# Patient Record
Sex: FEMALE | Race: WHITE | NOT HISPANIC OR LATINO | Employment: OTHER | URBAN - METROPOLITAN AREA
[De-identification: names, ages, dates, MRNs, and addresses within clinical notes are randomized per-mention and may not be internally consistent; named-entity substitution may affect disease eponyms.]

---

## 2020-09-23 ENCOUNTER — NEW PATIENT COMPREHENSIVE (OUTPATIENT)
Dept: URBAN - METROPOLITAN AREA CLINIC 46 | Facility: CLINIC | Age: 55
End: 2020-09-23

## 2020-09-23 DIAGNOSIS — H52.7: ICD-10-CM

## 2020-09-23 PROCEDURE — 92004 COMPRE OPH EXAM NEW PT 1/>: CPT

## 2020-09-23 PROCEDURE — 92015 DETERMINE REFRACTIVE STATE: CPT

## 2020-09-23 ASSESSMENT — VISUAL ACUITY
OS_SC: 20/25+2
OS_CC: 20/20
OS_SC: J8
OD_CC: 20/20
OD_SC: 20/30-1
OD_SC: J3
OS_CC: J1
OD_CC: J1

## 2020-09-23 ASSESSMENT — TONOMETRY
OD_IOP_MMHG: 15
OS_IOP_MMHG: 14

## 2020-10-19 ENCOUNTER — CONTACT LENS EXAM ESTABLISHED (OUTPATIENT)
Dept: URBAN - METROPOLITAN AREA CLINIC 46 | Facility: CLINIC | Age: 55
End: 2020-10-19

## 2020-10-19 DIAGNOSIS — Z46.0: ICD-10-CM

## 2020-10-19 PROCEDURE — 92310-5 LEVEL 5 CONTACT LENS MANAGEMENT

## 2020-10-19 ASSESSMENT — VISUAL ACUITY
OD_CC: 20/30
OS_CC: J3
OS_CC: 20/25-1
OU_CC: J2
OU_CC: 20/25+2
OD_CC: J3

## 2020-12-17 ENCOUNTER — FOLLOW UP (OUTPATIENT)
Dept: URBAN - METROPOLITAN AREA CLINIC 46 | Facility: CLINIC | Age: 55
End: 2020-12-17

## 2020-12-17 DIAGNOSIS — H40.1131: ICD-10-CM

## 2020-12-17 PROCEDURE — 76514 ECHO EXAM OF EYE THICKNESS: CPT

## 2020-12-17 PROCEDURE — 92083 EXTENDED VISUAL FIELD XM: CPT

## 2020-12-17 PROCEDURE — 92133 CPTRZD OPH DX IMG PST SGM ON: CPT

## 2020-12-17 PROCEDURE — 92012 INTRM OPH EXAM EST PATIENT: CPT

## 2020-12-17 RX ORDER — LATANOPROST 50 UG/ML
1 SOLUTION/ DROPS OPHTHALMIC EVERY EVENING
Start: 2020-12-17

## 2020-12-17 ASSESSMENT — TONOMETRY
OD_IOP_MMHG: 23
OS_IOP_MMHG: 14
OS_IOP_MMHG: 19
OD_IOP_MMHG: 14

## 2020-12-17 ASSESSMENT — VISUAL ACUITY
OD_SC: 20/30-2
OS_SC: 20/20-1

## 2020-12-17 ASSESSMENT — PACHYMETRY
OS_CT_UM: 597
OD_CT_UM: 592

## 2021-02-03 ENCOUNTER — IOP CHECK (OUTPATIENT)
Dept: URBAN - METROPOLITAN AREA CLINIC 46 | Facility: CLINIC | Age: 56
End: 2021-02-03

## 2021-02-03 DIAGNOSIS — H40.1131: ICD-10-CM

## 2021-02-03 PROCEDURE — 92012 INTRM OPH EXAM EST PATIENT: CPT

## 2021-02-03 ASSESSMENT — VISUAL ACUITY
OD_CC: 20/40+1
OS_CC: 20/25-2

## 2021-02-03 ASSESSMENT — TONOMETRY
OD_IOP_MMHG: 13
OD_IOP_MMHG: 21
OS_IOP_MMHG: 20
OS_IOP_MMHG: 13

## 2021-08-09 ENCOUNTER — PREPPED CHART (OUTPATIENT)
Dept: URBAN - METROPOLITAN AREA CLINIC 46 | Facility: CLINIC | Age: 56
End: 2021-08-09

## 2024-11-23 ENCOUNTER — P2P PATIENT RECORD (OUTPATIENT)
Age: 59
End: 2024-11-23

## 2024-11-26 ENCOUNTER — TELEPHONE ENCOUNTER (OUTPATIENT)
Dept: URBAN - METROPOLITAN AREA CLINIC 63 | Facility: CLINIC | Age: 59
End: 2024-11-26

## 2024-12-03 ENCOUNTER — TELEPHONE ENCOUNTER (OUTPATIENT)
Dept: URBAN - METROPOLITAN AREA CLINIC 63 | Facility: CLINIC | Age: 59
End: 2024-12-03

## 2024-12-05 ENCOUNTER — OFFICE VISIT (OUTPATIENT)
Dept: URBAN - METROPOLITAN AREA CLINIC 60 | Facility: CLINIC | Age: 59
End: 2024-12-05

## 2025-04-09 ENCOUNTER — APPOINTMENT (OUTPATIENT)
Dept: URBAN - METROPOLITAN AREA CLINIC 148 | Facility: CLINIC | Age: 60
Setting detail: DERMATOLOGY
End: 2025-04-09

## 2025-04-09 DIAGNOSIS — L65.9 NONSCARRING HAIR LOSS, UNSPECIFIED: ICD-10-CM

## 2025-04-09 DIAGNOSIS — L23.1 ALLERGIC CONTACT DERMATITIS DUE TO ADHESIVES: ICD-10-CM

## 2025-04-09 PROCEDURE — 99203 OFFICE O/P NEW LOW 30 MIN: CPT

## 2025-04-09 PROCEDURE — ? COUNSELING

## 2025-04-09 PROCEDURE — ? ADDITIONAL NOTES

## 2025-04-09 PROCEDURE — ? PRESCRIPTION

## 2025-04-09 RX ORDER — FLUOCINONIDE 0.5 MG/ML
SOLUTION TOPICAL
Qty: 60 | Refills: 1 | Status: ERX | COMMUNITY
Start: 2025-04-09

## 2025-04-09 RX ADMIN — FLUOCINONIDE: 0.5 SOLUTION TOPICAL at 00:00

## 2025-04-09 ASSESSMENT — LOCATION DETAILED DESCRIPTION DERM
LOCATION DETAILED: RIGHT INFERIOR LATERAL LOWER BACK
LOCATION DETAILED: LEFT BUTTOCK
LOCATION DETAILED: LEFT MEDIAL FRONTAL SCALP

## 2025-04-09 ASSESSMENT — LOCATION ZONE DERM
LOCATION ZONE: SCALP
LOCATION ZONE: TRUNK

## 2025-04-09 ASSESSMENT — LOCATION SIMPLE DESCRIPTION DERM
LOCATION SIMPLE: RIGHT LOWER BACK
LOCATION SIMPLE: LEFT BUTTOCK
LOCATION SIMPLE: LEFT SCALP

## 2025-04-09 ASSESSMENT — BSA RASH: BSA RASH: 3

## 2025-04-09 ASSESSMENT — ITCH NUMERIC RATING SCALE: HOW SEVERE IS YOUR ITCHING?: 7

## 2025-04-09 NOTE — PROCEDURE: ADDITIONAL NOTES
Render Risk Assessment In Note?: yes
Detail Level: Detailed
Additional Notes: Pt was given TAC has not applied, just picked up cream today, so advised pt to use the TAC as prescribed and if no improvement to come back in for further evaluation
Additional Notes: Pt has gotten a bloodwork slip from PCP, no family history of any autoimmune issues

## 2025-05-07 ENCOUNTER — APPOINTMENT (OUTPATIENT)
Dept: URBAN - METROPOLITAN AREA CLINIC 148 | Facility: CLINIC | Age: 60
Setting detail: DERMATOLOGY
End: 2025-05-07

## 2025-05-07 DIAGNOSIS — L23.1 ALLERGIC CONTACT DERMATITIS DUE TO ADHESIVES: ICD-10-CM

## 2025-05-07 DIAGNOSIS — D18.0 HEMANGIOMA: ICD-10-CM

## 2025-05-07 PROBLEM — D18.01 HEMANGIOMA OF SKIN AND SUBCUTANEOUS TISSUE: Status: ACTIVE | Noted: 2025-05-07

## 2025-05-07 PROCEDURE — ? COUNSELING

## 2025-05-07 PROCEDURE — ? PRESCRIPTION

## 2025-05-07 PROCEDURE — ? ADDITIONAL NOTES

## 2025-05-07 PROCEDURE — 99214 OFFICE O/P EST MOD 30 MIN: CPT

## 2025-05-07 RX ORDER — CLOBETASOL PROPIONATE 0.5 MG/G
OINTMENT TOPICAL
Qty: 60 | Refills: 1 | Status: ERX | COMMUNITY
Start: 2025-05-07

## 2025-05-07 RX ADMIN — CLOBETASOL PROPIONATE: 0.5 OINTMENT TOPICAL at 00:00

## 2025-05-07 ASSESSMENT — LOCATION DETAILED DESCRIPTION DERM
LOCATION DETAILED: LEFT NASAL ALA
LOCATION DETAILED: RIGHT INFERIOR LATERAL LOWER BACK
LOCATION DETAILED: LEFT BUTTOCK

## 2025-05-07 ASSESSMENT — ITCH NUMERIC RATING SCALE: HOW SEVERE IS YOUR ITCHING?: 1

## 2025-05-07 ASSESSMENT — LOCATION SIMPLE DESCRIPTION DERM
LOCATION SIMPLE: LEFT NOSE
LOCATION SIMPLE: RIGHT LOWER BACK
LOCATION SIMPLE: LEFT BUTTOCK

## 2025-05-07 ASSESSMENT — LOCATION ZONE DERM
LOCATION ZONE: TRUNK
LOCATION ZONE: NOSE

## 2025-05-07 ASSESSMENT — BSA RASH: BSA RASH: 1

## 2025-05-07 NOTE — HPI: RASH
What Type Of Note Output Would You Prefer (Optional)?: Bullet Format
Is The Patient Presenting As Previously Scheduled?: Yes
How Severe Is Your Rash?: moderate
Is This A New Presentation, Or A Follow-Up?: Rash
Additional History: Pt use TAC and noticed improvement stoped for two days the rash reappeared

## 2025-05-07 NOTE — PROCEDURE: ADDITIONAL NOTES
Render Risk Assessment In Note?: yes
Detail Level: Detailed
Additional Notes: Pt has noticed improvement while using the TAC, pt given Clobetasol as needed when itching flares, advised pt to use all free and clear products.

## 2025-07-11 RX ORDER — CLOBETASOL PROPIONATE 0.5 MG/G
OINTMENT TOPICAL
Qty: 60 | Refills: 1 | Status: ERX

## 2025-07-14 ENCOUNTER — APPOINTMENT (OUTPATIENT)
Dept: URBAN - METROPOLITAN AREA CLINIC 148 | Facility: CLINIC | Age: 60
Setting detail: DERMATOLOGY
End: 2025-07-14

## 2025-07-14 DIAGNOSIS — L30.9 DERMATITIS, UNSPECIFIED: ICD-10-CM

## 2025-07-14 PROCEDURE — ? PRESCRIPTION MEDICATION MANAGEMENT

## 2025-07-14 PROCEDURE — ? BIOPSY BY PUNCH METHOD

## 2025-07-14 PROCEDURE — ? COUNSELING

## 2025-07-14 PROCEDURE — ? ORDER TESTS

## 2025-07-14 PROCEDURE — ? PRESCRIPTION

## 2025-07-14 RX ORDER — MUPIROCIN 20 MG/G
OINTMENT TOPICAL
Qty: 22 | Refills: 2 | Status: ERX | COMMUNITY
Start: 2025-07-14

## 2025-07-14 RX ADMIN — MUPIROCIN: 20 OINTMENT TOPICAL at 00:00

## 2025-07-14 ASSESSMENT — LOCATION DETAILED DESCRIPTION DERM: LOCATION DETAILED: LEFT BUTTOCK

## 2025-07-14 ASSESSMENT — LOCATION ZONE DERM: LOCATION ZONE: TRUNK

## 2025-07-14 ASSESSMENT — BSA RASH: BSA RASH: 2

## 2025-07-14 ASSESSMENT — LOCATION SIMPLE DESCRIPTION DERM: LOCATION SIMPLE: LEFT BUTTOCK

## 2025-07-14 NOTE — PROCEDURE: ORDER TESTS
Bill For Surgical Tray: no
Performing Laboratory: [id number]
Billing Type: Third-Party Bill
Expected Date Of Service: 07/14/2025
Lab Facility: [id number]

## 2025-07-14 NOTE — PROCEDURE: BIOPSY BY PUNCH METHOD
Detail Level: Detailed
Was A Bandage Applied: Yes
Punch Size In Mm: 4
Size Of Lesion In Cm (Optional): 0
Depth Of Punch Biopsy: dermis
Biopsy Type: H and E
Anesthesia Type: 1% lidocaine with 1:400,000 epinephrine
Anesthesia Volume In Cc: 0.5
Hemostasis: Aluminum Chloride
Epidermal Sutures: 3-0 Monocryl
Wound Care: Bacitracin
Dressing: bandage
Suture Removal: 14 days
Patient Will Remove Sutures At Home?: No
Lab: 6
Lab Facility: [id number]
Consent: Written consent was obtained and risks were reviewed including but not limited to scarring, infection, bleeding, scabbing, incomplete removal, nerve damage and allergy to anesthesia.
Post-Care Instructions: I reviewed with the patient in detail post-care instructions. Patient is to keep the biopsy site dry overnight, wash with soap and water and then apply Vaseline twice daily until healed.
Home Suture Removal Text: N/A
Notification Instructions: Patient is to keep follow up appointment to discuss biopsy results.
Billing Type: Third-Party Bill
Information: Selecting Yes will display possible errors in your note based on the variables you have selected. This validation is only offered as a suggestion for you. PLEASE NOTE THAT THE VALIDATION TEXT WILL BE REMOVED WHEN YOU FINALIZE YOUR NOTE. IF YOU WANT TO FAX A PRELIMINARY NOTE YOU WILL NEED TO TOGGLE THIS TO 'NO' IF YOU DO NOT WANT IT IN YOUR FAXED NOTE.

## 2025-07-23 ENCOUNTER — APPOINTMENT (OUTPATIENT)
Dept: URBAN - METROPOLITAN AREA CLINIC 148 | Facility: CLINIC | Age: 60
Setting detail: DERMATOLOGY
End: 2025-07-23

## 2025-07-23 DIAGNOSIS — B00.1 HERPESVIRAL VESICULAR DERMATITIS: ICD-10-CM

## 2025-07-23 PROCEDURE — ? COUNSELING

## 2025-07-23 PROCEDURE — ? PRESCRIPTION

## 2025-07-23 RX ORDER — VALACYCLOVIR HYDROCHLORIDE 500 MG/1
TABLET, FILM COATED ORAL
Qty: 40 | Refills: 3 | Status: ERX | COMMUNITY
Start: 2025-07-23

## 2025-07-23 RX ADMIN — VALACYCLOVIR HYDROCHLORIDE: 500 TABLET, FILM COATED ORAL at 00:00

## 2025-07-23 ASSESSMENT — LOCATION DETAILED DESCRIPTION DERM: LOCATION DETAILED: LEFT BUTTOCK

## 2025-07-23 ASSESSMENT — LOCATION SIMPLE DESCRIPTION DERM: LOCATION SIMPLE: LEFT BUTTOCK

## 2025-07-23 ASSESSMENT — LOCATION ZONE DERM: LOCATION ZONE: TRUNK

## 2025-07-23 NOTE — PROCEDURE: COUNSELING
Detail Level: Detailed
Patient Specific Counseling (Will Not Stick From Patient To Patient): Continue with Bactroban and wash with hibiclens 3 x per week for 1 week

## 2025-08-07 RX ORDER — MUPIROCIN 20 MG/G
OINTMENT TOPICAL
Qty: 22 | Refills: 2 | Status: ERX